# Patient Record
Sex: MALE | Race: WHITE | NOT HISPANIC OR LATINO | URBAN - NONMETROPOLITAN AREA
[De-identification: names, ages, dates, MRNs, and addresses within clinical notes are randomized per-mention and may not be internally consistent; named-entity substitution may affect disease eponyms.]

---

## 2019-02-18 ENCOUNTER — IMPORTED ENCOUNTER (OUTPATIENT)
Dept: URBAN - NONMETROPOLITAN AREA CLINIC 1 | Facility: CLINIC | Age: 59
End: 2019-02-18

## 2019-02-18 PROBLEM — H52.223: Noted: 2019-02-18

## 2019-02-18 PROBLEM — H25.813: Noted: 2019-02-18

## 2019-02-18 PROBLEM — H52.03: Noted: 2019-02-18

## 2019-02-18 PROCEDURE — 92015 DETERMINE REFRACTIVE STATE: CPT

## 2019-02-18 PROCEDURE — 92004 COMPRE OPH EXAM NEW PT 1/>: CPT

## 2019-02-18 NOTE — PATIENT DISCUSSION
Hyperopia/Astigmatism/Presbyopia -Discussed diagnosis with patient. Cataract OU -Visually significant.-Cataract(s) causing symptomatic impairment of visual function not correctable with a tolerable change in glasses or contact lenses lighting or non-operative means resulting in specific activity limitations and/or participation restrictions including but not limited to reading viewing television driving or meeting vocational or recreational needs. -Expectation is clearer vision and reduced glare disability after cataract removal.-Refer to Dr Wei Leonard for cataract evaluation. Pt would like to wait at this time and get the new specs rx. Pt thinks he may want to do it in the fall. Updated spec Rx given. Recommend lens that will provide comfort as well as protect safety and health of eyes.

## 2020-11-23 ENCOUNTER — IMPORTED ENCOUNTER (OUTPATIENT)
Dept: URBAN - NONMETROPOLITAN AREA CLINIC 1 | Facility: CLINIC | Age: 60
End: 2020-11-23

## 2020-11-23 PROCEDURE — 92014 COMPRE OPH EXAM EST PT 1/>: CPT

## 2020-11-23 NOTE — PATIENT DISCUSSION
Hyperopia/Astigmatism/Presbyopia Discussed diagnosis with patient. Cataract OU -Visually significant.-Cataract(s) causing symptomatic impairment of visual function not correctable with a tolerable change in glasses or contact lenses lighting or non-operative means resulting in specific activity limitations and/or participation restrictions including but not limited to reading viewing television driving or meeting vocational or recreational needs. -Expectation is clearer vision and reduced glare disability after cataract removal.-Refer to Dr Jonathan Valentin for cataract evaluation.

## 2021-11-30 ENCOUNTER — IMPORTED ENCOUNTER (OUTPATIENT)
Dept: URBAN - NONMETROPOLITAN AREA CLINIC 1 | Facility: CLINIC | Age: 61
End: 2021-11-30

## 2021-11-30 PROBLEM — H25.813: Noted: 2021-11-30

## 2021-11-30 PROBLEM — H52.03: Noted: 2021-11-30

## 2021-11-30 PROBLEM — H52.223: Noted: 2021-11-30

## 2021-11-30 NOTE — PATIENT DISCUSSION
Cataract(s)-Visually significant cataract OU .-Cataract(s) causing symptomatic impairment of visual function not correctable with a tolerable change in glasses or contact lenses lighting or non-operative means resulting in specific activity limitations and/or participation restrictions including but not limited to reading viewing television driving or meeting vocational or recreational needs. -Expectation is clearer vision and functional improvement in symptoms as well as reduced glare disability after cataract removal.-Order IOLMaster and OPD today. -Recommend LRI OD/Toric OS based on today's OPD testing and lifestyle questionnaire.-All questions were answered regarding surgery including pre and post-op medications appointments activity restrictions and anesthetic usage.-The risks benefits and alternatives and special risk factors for the patient were discussed in detail including but not limited to: bleeding infection retinal detachment vitreous loss problems with the implant and possible need for additional surgery.-Although rare the possibility of complete vision loss was discussed.-The possible need for glasses post-operatively was discussed.-Order medical clearance exam based on history of HBP-Patient elects to proceed with cataract surgery OS . Will schedule at patient's convenience and re-evaluate OD  in the future. Pt elects LRI OD & Toric OS. Advised pt the need for reading gls. Pt elects LenSx OU Post op inflammation anticipated discussed Dextenza insertion after surgery.  At & Lotemax TID OU x 1 Week & RTC for repeat gus OU

## 2021-12-08 ENCOUNTER — IMPORTED ENCOUNTER (OUTPATIENT)
Dept: URBAN - NONMETROPOLITAN AREA CLINIC 1 | Facility: CLINIC | Age: 61
End: 2021-12-08

## 2021-12-08 PROCEDURE — 92025 CPTRIZED CORNEAL TOPOGRAPHY: CPT

## 2021-12-08 PROCEDURE — 99214 OFFICE O/P EST MOD 30 MIN: CPT

## 2022-03-11 ENCOUNTER — CONSULTATION/EVALUATION (OUTPATIENT)
Dept: URBAN - NONMETROPOLITAN AREA CLINIC 4 | Facility: CLINIC | Age: 62
End: 2022-03-11

## 2022-03-11 DIAGNOSIS — H25.13: ICD-10-CM

## 2022-03-11 PROCEDURE — 99214 OFFICE O/P EST MOD 30 MIN: CPT

## 2022-03-11 ASSESSMENT — VISUAL ACUITY
OS_CC: 20/30
OS_PH: 20/40
OD_SC: 20/40
OS_BAT: 20/100
OD_PH: 20/25
OS_AM: 20/25
OD_CC: 20/30
OD_PAM: 20/25
OD_BAT: 20/50
OS_SC: 20/50

## 2022-03-11 NOTE — PATIENT DISCUSSION
(Surgical) Visually Significant (secondary to glare), discussed the risks, benefits, alternatives, and limitations of cataract surgery. The patient stated a full understanding and a desire to proceed with the procedure. The patient will need to return for pre-op appointment with cataract measurements and to have any additional questions answered, and start pre-operative eye drops as directed. Pt elects to proceed with first and then afterBased off today testing qualifies for LRI, previous showed Toric. Recommend start AT & Lotemax TID OU x 1 week and RTC for Repeat Kee OU. Pt elects LenSx OU. Dextenza OU +.

## 2022-03-31 ENCOUNTER — PRE-OP/H&P (OUTPATIENT)
Dept: URBAN - NONMETROPOLITAN AREA CLINIC 4 | Facility: CLINIC | Age: 62
End: 2022-03-31

## 2022-03-31 VITALS
HEART RATE: 75 BPM | RESPIRATION RATE: 15 BRPM | WEIGHT: 170 LBS | DIASTOLIC BLOOD PRESSURE: 84 MMHG | HEIGHT: 69 IN | SYSTOLIC BLOOD PRESSURE: 149 MMHG | BODY MASS INDEX: 25.18 KG/M2

## 2022-03-31 DIAGNOSIS — H25.13: ICD-10-CM

## 2022-03-31 PROCEDURE — 99499 UNLISTED E&M SERVICE: CPT

## 2022-04-15 ASSESSMENT — VISUAL ACUITY
OS_PH: 20/30
OS_AM: 20/20
OS_SC: 20/40-1
OD_CC: 20/30
OS_CC: 20/50
OU_CC: 20/20
OD_SC: 20/25
OD_GLARE: 20/40
OU_CC: 20/20
OD_SC: 20/30-
OS_GLARE: 20/100
OU_CC: 20/30
OS_SC: 20/40
OD_PAM: 20/20

## 2022-04-15 ASSESSMENT — TONOMETRY
OD_IOP_MMHG: 16
OS_IOP_MMHG: 13
OD_IOP_MMHG: 16
OS_IOP_MMHG: 15
OS_IOP_MMHG: 15
OD_IOP_MMHG: 16

## 2022-04-26 ENCOUNTER — POST-OP (OUTPATIENT)
Dept: URBAN - NONMETROPOLITAN AREA CLINIC 4 | Facility: CLINIC | Age: 62
End: 2022-04-26

## 2022-04-26 DIAGNOSIS — Z96.1: ICD-10-CM

## 2022-04-26 PROCEDURE — 99024 POSTOP FOLLOW-UP VISIT: CPT

## 2022-04-26 ASSESSMENT — VISUAL ACUITY
OD_SC: 20/30-2
OS_SC: 20/50-2
OS_PH: 20/30-2

## 2022-04-26 ASSESSMENT — TONOMETRY
OD_IOP_MMHG: 18
OS_IOP_MMHG: 20

## 2022-05-02 ENCOUNTER — POST OP/EVAL OF SECOND EYE (OUTPATIENT)
Dept: URBAN - NONMETROPOLITAN AREA CLINIC 4 | Facility: CLINIC | Age: 62
End: 2022-05-02

## 2022-05-02 VITALS
DIASTOLIC BLOOD PRESSURE: 80 MMHG | SYSTOLIC BLOOD PRESSURE: 142 MMHG | HEIGHT: 69 IN | WEIGHT: 165 LBS | RESPIRATION RATE: 17 BRPM | HEART RATE: 68 BPM | BODY MASS INDEX: 24.44 KG/M2

## 2022-05-02 DIAGNOSIS — Z96.1: ICD-10-CM

## 2022-05-02 DIAGNOSIS — H25.11: ICD-10-CM

## 2022-05-02 PROCEDURE — 99024 POSTOP FOLLOW-UP VISIT: CPT

## 2022-05-02 ASSESSMENT — TONOMETRY
OD_IOP_MMHG: 18
OS_IOP_MMHG: 17

## 2022-05-02 ASSESSMENT — VISUAL ACUITY
OD_PH: 20/40
OS_SC: 20/30-1
OD_PAM: 20/30
OD_SC: 20/60+2

## 2022-05-09 ENCOUNTER — SURGERY/PROCEDURE (OUTPATIENT)
Dept: URBAN - METROPOLITAN AREA SURGERY 3 | Facility: SURGERY | Age: 62
End: 2022-05-09

## 2022-05-09 DIAGNOSIS — H25.11: ICD-10-CM

## 2022-05-09 PROCEDURE — 66984 XCAPSL CTRC RMVL W/O ECP: CPT

## 2022-05-09 PROCEDURE — 68841 INSJ RX ELUT IMPLT LAC CANAL: CPT

## 2022-05-10 ENCOUNTER — POST-OP (OUTPATIENT)
Dept: URBAN - NONMETROPOLITAN AREA CLINIC 4 | Facility: CLINIC | Age: 62
End: 2022-05-10

## 2022-05-10 DIAGNOSIS — Z96.1: ICD-10-CM

## 2022-05-10 PROCEDURE — 99024 POSTOP FOLLOW-UP VISIT: CPT

## 2022-05-10 ASSESSMENT — VISUAL ACUITY
OS_SC: 20/40
OD_PH: 20/25
OS_PH: 20/30
OD_SC: 20/40

## 2022-05-10 ASSESSMENT — TONOMETRY
OS_IOP_MMHG: 19
OD_IOP_MMHG: 23

## 2022-05-16 ENCOUNTER — POST-OP (OUTPATIENT)
Dept: URBAN - NONMETROPOLITAN AREA CLINIC 4 | Facility: CLINIC | Age: 62
End: 2022-05-16

## 2022-05-16 DIAGNOSIS — Z96.1: ICD-10-CM

## 2022-05-16 PROCEDURE — 99024 POSTOP FOLLOW-UP VISIT: CPT

## 2022-05-16 ASSESSMENT — VISUAL ACUITY
OS_SC: 20/20
OD_SC: 20/20

## 2022-05-16 ASSESSMENT — TONOMETRY
OS_IOP_MMHG: 18
OD_IOP_MMHG: 20

## 2025-04-15 ENCOUNTER — EMERGENCY VISIT (OUTPATIENT)
Age: 65
End: 2025-04-15

## 2025-04-15 DIAGNOSIS — H53.2: ICD-10-CM

## 2025-04-15 DIAGNOSIS — Z96.1: ICD-10-CM

## 2025-04-15 DIAGNOSIS — H26.493: ICD-10-CM

## 2025-04-15 PROCEDURE — 99213 OFFICE O/P EST LOW 20 MIN: CPT
